# Patient Record
Sex: FEMALE | Race: WHITE
[De-identification: names, ages, dates, MRNs, and addresses within clinical notes are randomized per-mention and may not be internally consistent; named-entity substitution may affect disease eponyms.]

---

## 2017-01-03 ENCOUNTER — HOSPITAL ENCOUNTER (OUTPATIENT)
Dept: HOSPITAL 45 - C.NUCL | Age: 77
Discharge: HOME | End: 2017-01-03
Attending: INTERNAL MEDICINE
Payer: COMMERCIAL

## 2017-01-03 DIAGNOSIS — R76.8: ICD-10-CM

## 2017-01-03 DIAGNOSIS — M54.5: ICD-10-CM

## 2017-01-03 DIAGNOSIS — M54.12: Primary | ICD-10-CM

## 2017-01-04 LAB
ALBUMIN SERPL-MCNC: 3.7 G/DL (ref 3.8–4.8)
BETA-2-GLOBULIN: 0.3 G/DL (ref 0.2–0.5)
ELECTROPHORESIS INTERPRET: (no result)
GAMMA GLOB SERPL ELPH-MCNC: 0.8 G/DL (ref 0.8–1.7)
PROT SERPL-MCNC: 6 G/DL (ref 6.2–8.3)

## 2017-01-13 ENCOUNTER — HOSPITAL ENCOUNTER (OUTPATIENT)
Dept: HOSPITAL 45 - C.RAD1850 | Age: 77
Discharge: HOME | End: 2017-01-13
Attending: INTERNAL MEDICINE
Payer: COMMERCIAL

## 2017-01-13 DIAGNOSIS — M51.36: ICD-10-CM

## 2017-01-13 DIAGNOSIS — R93.7: Primary | ICD-10-CM

## 2017-01-13 DIAGNOSIS — M47.814: ICD-10-CM

## 2017-01-13 DIAGNOSIS — M16.9: ICD-10-CM

## 2017-01-13 NOTE — DIAGNOSTIC IMAGING REPORT
THORACIC SPINE 3 VIEWS



HISTORY:      R93.7 Abnormal bone scan of thoracic [id number]



COMPARISON: Bone scan 1/3/2017.



FINDINGS: There is no fracture.  No subluxation. Mild dextroscoliosis of the

lower thoracic spine. Moderate degenerative disease throughout the thoracic

spine. There are severe degenerative disc disease at L1-L2. Some of this may

account for the radiotracer uptake on the bone scan. No suspicious lytic or

blastic osseous lesions identified within the thoracic spine. Paraspinal soft

tissues are unremarkable.



IMPRESSION:  



1. Moderate degenerative disease throughout the thoracic spine and severe

degenerative disc disease at L1-L2. Some of this may account for the radiotracer

uptake on the bone scan.

2. No suspicious lytic or blastic osseous lesions within the thoracic spine by

conventional radiographic technique.







Electronically signed by:  Matias Vaca M.D.

1/13/2017 9:31 AM



Dictated Date/Time:  1/13/2017 9:27 AM

## 2017-01-13 NOTE — DIAGNOSTIC IMAGING REPORT
PELVIS 1 OR 2 VIEW ROUTINE



CLINICAL HISTORY: Abnormal bone scan.    



COMPARISON STUDY:  Bone scan dated 1/3/2017, AP pelvis dated 6/16/2015



FINDINGS: There are postsurgical changes of a total right hip arthroplasty. No

fractures are visualized. There are sclerotic changes involving the inferior

margin left SI joint which may explain the focus of increased activity on the

recent bone scan. No destructive lesions are identified. Degenerative changes

are present within the visualized portions of the lower lumbar spine



IMPRESSION:  

1. No acute fractures

2. Degenerative changes with sclerosis involving the inferior margin of the left

SI joint. This likely explains the corresponding focus of increased activity on

the most recent bone scan









Electronically signed by:  Stephane Thakkar M.D.

1/13/2017 9:36 AM



Dictated Date/Time:  1/13/2017 9:34 AM

## 2017-02-07 ENCOUNTER — HOSPITAL ENCOUNTER (OUTPATIENT)
Dept: HOSPITAL 45 - C.MRIBC | Age: 77
Discharge: HOME | End: 2017-02-07
Attending: ORTHOPAEDIC SURGERY
Payer: COMMERCIAL

## 2017-02-07 DIAGNOSIS — M48.06: Primary | ICD-10-CM

## 2017-02-07 NOTE — DIAGNOSTIC IMAGING REPORT
MRI LUMBAR SPINE W/O CONTRAST



CLINICAL HISTORY: Low back pain with bilateral leg radiculopathy. Spinal

stenosis.    



TECHNIQUE: Sagittal and axial T1, T2 and STIR images were obtained.



COMPARISON STUDY:  10/22/2014 



OBSERVATIONS:



The vertebral bodies and posterior elements appear intact. There is no abnormal

bony signal present to suggest a marrow replacement process.



L1-2: There is marked degenerative change with degenerative endplate marrow

edema. There is a circumferential disc bulge present. There is no significant

spinal foraminal stenosis



L2-3: There is a circumferential disc bulge present. There is no significant

spinal foraminal stenosis



L3-4: There is a circumferential disc bulge present with mild spinal stenosis.

There is no significant foraminal narrowing.



L4-5: There is a grade 1 spondylolisthesis of L4 and L5. There is advanced facet

joint arthropathy. There is moderate to severe spinal stenosis. There is no

significant foraminal narrowing.



L5-S1: There is a grade 1 spondylolisthesis of L5 and S1. There is facet joint

arthropathy. There is no significant spinal or foraminal stenosis.



The conus medullaris and cauda equina appear normal.



IMPRESSION: No significant change from the preceding study. Multilevel

spondylitic changes. Mild spinal stenosis the L3-4 level, and moderate to severe

spinal stenosis at the L4-5 level.







Electronically signed by:  Stephane Thakkar M.D.

2/7/2017 11:46 AM



Dictated Date/Time:  2/7/2017 11:40 AM

## 2017-03-13 ENCOUNTER — HOSPITAL ENCOUNTER (OUTPATIENT)
Dept: HOSPITAL 45 - C.MAMM | Age: 77
End: 2017-03-13
Attending: OBSTETRICS & GYNECOLOGY
Payer: COMMERCIAL

## 2017-03-13 DIAGNOSIS — Z12.31: Primary | ICD-10-CM

## 2017-03-13 NOTE — MAMMOGRAPHY REPORT
BILATERAL DIGITAL SCREENING MAMMOGRAM WITH CAD: 3/13/2017

CLINICAL HISTORY: Routine screening.  Patient has no complaints.  





TECHNIQUE: Bilateral CC and MLO views were obtained.  Current study was also evaluated with a Comput
er Aided Detection (CAD) system.  



COMPARISON: Comparison is made to exams dated:  3/10/2016 mammogram, 2/11/2014 mammogram, 4/5/2016 u
ltrasound, and 4/5/2016 mammogram - [facility].   



BREAST COMPOSITION:  The tissue of both breasts is heterogeneously dense, which may obscure small ma
sses.  



FINDINGS: The parenchymal pattern is similar to prior exams.  There are stable round microcalcificat
ions in the right breast.  No new suspicious mass, architectural distortion or cluster of microcalci
fications is seen.  



IMPRESSION:  ACR BI-RADS CATEGORY 1: NEGATIVE

There is no mammographic evidence of malignancy. A 1 year screening mammogram is recommended.  The p
atient will receive written notification of the results.  





Approximately 10% of breast cancers are not detected with mammography. A negative mammographic repor
t should not delay biopsy if a clinically suggestive mass is present.



Krissy Meeks M.D.          

ay/:3/13/2017 12:20:06  



Imaging Technologist: Carolyn COREA(JAZMINE)(M), [facility]

letter sent: Normal 1/2  

BI-RADS Code: ACR BI-RADS Category 1: Negative

## 2017-04-22 ENCOUNTER — HOSPITAL ENCOUNTER (OUTPATIENT)
Dept: HOSPITAL 45 - C.LABBC | Age: 77
Discharge: HOME | End: 2017-04-22
Attending: INTERNAL MEDICINE
Payer: COMMERCIAL

## 2017-04-22 DIAGNOSIS — E11.9: ICD-10-CM

## 2017-04-22 DIAGNOSIS — E78.5: Primary | ICD-10-CM

## 2017-04-22 DIAGNOSIS — J30.9: ICD-10-CM

## 2017-04-22 DIAGNOSIS — I47.1: ICD-10-CM

## 2017-04-22 DIAGNOSIS — M15.9: ICD-10-CM

## 2017-04-22 DIAGNOSIS — M51.36: ICD-10-CM

## 2017-04-22 DIAGNOSIS — G47.00: ICD-10-CM

## 2017-04-22 LAB
ALBUMIN/GLOB SERPL: 1.2 {RATIO} (ref 0.9–2)
ALP SERPL-CCNC: 48 U/L (ref 45–117)
ALT SERPL-CCNC: 32 U/L (ref 12–78)
ANION GAP SERPL CALC-SCNC: 8 MMOL/L (ref 3–11)
AST SERPL-CCNC: 23 U/L (ref 15–37)
BASOPHILS # BLD: 0.02 K/UL (ref 0–0.2)
BASOPHILS NFR BLD: 0.3 %
BUN SERPL-MCNC: 18 MG/DL (ref 7–18)
BUN/CREAT SERPL: 21.7 (ref 10–20)
CALCIUM SERPL-MCNC: 8.8 MG/DL (ref 8.5–10.1)
CHLORIDE SERPL-SCNC: 104 MMOL/L (ref 98–107)
CHOLEST/HDLC SERPL: 2.1 {RATIO}
CO2 SERPL-SCNC: 29 MMOL/L (ref 21–32)
COMPLETE: YES
CREAT SERPL-MCNC: 0.83 MG/DL (ref 0.6–1.2)
EOSINOPHIL NFR BLD AUTO: 266 K/UL (ref 130–400)
EST. AVERAGE GLUCOSE BLD GHB EST-MCNC: 123 MG/DL
GLOBULIN SER-MCNC: 3.3 GM/DL (ref 2.5–4)
GLUCOSE SERPL-MCNC: 99 MG/DL (ref 70–99)
GLUCOSE UR QL: 88 MG/DL
HCT VFR BLD CALC: 45.8 % (ref 37–47)
IG%: 0.1 %
IMM GRANULOCYTES NFR BLD AUTO: 28.8 %
KETONES UR QL STRIP: 80 MG/DL
LYMPHOCYTES # BLD: 2.07 K/UL (ref 1.2–3.4)
MCH RBC QN AUTO: 31.1 PG (ref 25–34)
MCHC RBC AUTO-ENTMCNC: 33.2 G/DL (ref 32–36)
MCV RBC AUTO: 93.7 FL (ref 80–100)
MONOCYTES NFR BLD: 8.2 %
NEUTROPHILS # BLD AUTO: 1 %
NEUTROPHILS NFR BLD AUTO: 61.6 %
NITRITE UR QL STRIP: 96 MG/DL (ref 0–150)
PH UR: 187 MG/DL (ref 0–200)
PMV BLD AUTO: 9.6 FL (ref 7.4–10.4)
POTASSIUM SERPL-SCNC: 4.2 MMOL/L (ref 3.5–5.1)
RBC # BLD AUTO: 4.89 M/UL (ref 4.2–5.4)
SODIUM SERPL-SCNC: 141 MMOL/L (ref 136–145)
TSH SERPL-ACNC: 1.55 UIU/ML (ref 0.3–4.5)
VERY LOW DENSITY LIPOPROT CALC: 19 MG/DL
WBC # BLD AUTO: 7.2 K/UL (ref 4.8–10.8)

## 2017-06-29 ENCOUNTER — HOSPITAL ENCOUNTER (EMERGENCY)
Dept: HOSPITAL 45 - C.EDB | Age: 77
LOS: 1 days | Discharge: HOME | End: 2017-06-30
Payer: COMMERCIAL

## 2017-06-29 VITALS
WEIGHT: 138.01 LBS | HEIGHT: 62.01 IN | BODY MASS INDEX: 25.08 KG/M2 | WEIGHT: 138.01 LBS | HEIGHT: 62.01 IN | BODY MASS INDEX: 25.08 KG/M2

## 2017-06-29 VITALS — TEMPERATURE: 97.52 F

## 2017-06-29 DIAGNOSIS — Z79.899: ICD-10-CM

## 2017-06-29 DIAGNOSIS — M54.2: ICD-10-CM

## 2017-06-29 DIAGNOSIS — M19.90: ICD-10-CM

## 2017-06-29 DIAGNOSIS — R19.7: ICD-10-CM

## 2017-06-29 DIAGNOSIS — R00.0: ICD-10-CM

## 2017-06-29 DIAGNOSIS — R42: ICD-10-CM

## 2017-06-29 DIAGNOSIS — Z79.82: ICD-10-CM

## 2017-06-29 DIAGNOSIS — I10: ICD-10-CM

## 2017-06-29 DIAGNOSIS — R00.2: Primary | ICD-10-CM

## 2017-06-29 LAB
ANION GAP SERPL CALC-SCNC: 4 MMOL/L (ref 3–11)
BASOPHILS # BLD: 0.04 K/UL (ref 0–0.2)
BASOPHILS NFR BLD: 0.7 %
BUN SERPL-MCNC: 18 MG/DL (ref 7–18)
BUN/CREAT SERPL: 19.4 (ref 10–20)
CALCIUM SERPL-MCNC: 9.3 MG/DL (ref 8.5–10.1)
CHLORIDE SERPL-SCNC: 105 MMOL/L (ref 98–107)
CO2 SERPL-SCNC: 30 MMOL/L (ref 21–32)
COMPLETE: YES
CREAT CL PREDICTED SERPL C-G-VRATE: 45.5 ML/MIN
CREAT SERPL-MCNC: 0.9 MG/DL (ref 0.6–1.2)
EOSINOPHIL NFR BLD AUTO: 263 K/UL (ref 130–400)
GLUCOSE SERPL-MCNC: 168 MG/DL (ref 70–99)
HCT VFR BLD CALC: 43.3 % (ref 37–47)
IG%: 0.2 %
IMM GRANULOCYTES NFR BLD AUTO: 34.8 %
LYMPHOCYTES # BLD: 2.08 K/UL (ref 1.2–3.4)
MCH RBC QN AUTO: 31.4 PG (ref 25–34)
MCHC RBC AUTO-ENTMCNC: 34.2 G/DL (ref 32–36)
MCV RBC AUTO: 91.9 FL (ref 80–100)
MONOCYTES NFR BLD: 8.7 %
NEUTROPHILS # BLD AUTO: 3.5 %
NEUTROPHILS NFR BLD AUTO: 52.1 %
PMV BLD AUTO: 9.3 FL (ref 7.4–10.4)
POTASSIUM SERPL-SCNC: 3.8 MMOL/L (ref 3.5–5.1)
RBC # BLD AUTO: 4.71 M/UL (ref 4.2–5.4)
SODIUM SERPL-SCNC: 139 MMOL/L (ref 136–145)
WBC # BLD AUTO: 5.97 K/UL (ref 4.8–10.8)

## 2017-06-29 NOTE — DIAGNOSTIC IMAGING REPORT
CHEST ONE VIEW PORTABLE



CLINICAL HISTORY: Chest pain. Tachycardia.    



COMPARISON STUDY:  Chest radiograph April 30, 2015.



FINDINGS: There is no pneumothorax or pleural effusion. Linear bilateral

opacities suggest atelectasis. There is no consolidation to suggest pneumonia.

Cardiomediastinal silhouette is normal. The appearance of the chest is

unchanged. 



IMPRESSION:  No acute cardiopulmonary findings. 









Electronically signed by:  Yoel Goodman M.D.

6/29/2017 9:37 PM



Dictated Date/Time:  6/29/2017 9:34 PM

## 2017-06-30 VITALS — HEART RATE: 85 BPM | SYSTOLIC BLOOD PRESSURE: 162 MMHG | DIASTOLIC BLOOD PRESSURE: 92 MMHG | OXYGEN SATURATION: 95 %

## 2017-06-30 NOTE — EMERGENCY ROOM VISIT NOTE
History


Report prepared by Brittany:  Susy Abbott


Under the Supervision of:  Dr. Abdi Delaney D.O.


First contact with patient:  20:32


Chief Complaint:  TACHYCARDIA


Stated Complaint:  RAPID HEARTRATE, STIFF NECK, THROAT ULCERS, DIZZY





History of Present Illness


The patient is a 76 year old female who presents to the Emergency Room with 

complaints of intermittent rapid heart rate/palpitations beginning 13 minutes 

ago. The patient states that last week she had a sore throat and mouth ulcer. 

She reports that she went to see her PCP and was told that it was viral. She 

notes that she was given Azithromycin and was told not to take it unless her 

symptoms persisted. She notes that she started it and has been on it for about 

3 days. She reports that today she was feeling lightheaded with a stiff neck. 

She notes that she had a rapid heart rate this morning 13 hours ago and it 

resolved after about 5 minutes before coming back tonight 2 hours ago. The 

patient notes that her rapid heart rate lasted for about 20 minutes before she 

came in tonight.  She notes her heart feels like it is beating regularly as 

opposed to a regular.  She did not check her pulse.  She complains of dizziness 

and 3 episodes of diarrhea. The patient denies any chest pain, shortness of 

breath, fever, leg swelling, coughing up blood, history of cancer, previous 

blood clots, recent trips, recent surgeries or smoking.. She reports that she 

has a history of high cholesterol and SVT 12 years ago and this feels slightly 

similar.  Patient also denies a history of diabetes, hypertension, and CAD.  

She does take a statin.





   Source of History:  patient


   Onset:  13 hours ago


   Position:  other (global)


   Symptom Intensity:  2 episodes


   Quality:  other (rapid heart rate)


   Timing:  intermittent


   Associated Symptoms:  + sorethroat, + neck pain, No fevers, No chest pain, 

No SOB


Note:


Pt complains of mouth sores. The patient denies any leg swelling, coughing up 

blood, swelling of the calves.





Review of Systems


See HPI for pertinent positives & negatives. A total of 10 systems reviewed and 

were otherwise negative.





Past Medical & Surgical


Medical Problems:


(1) Hypertension Nos


(2) Osteoarthros Nos-Unspec








Family History





No pertinent family history





Social History


Smoking Status:  Never Smoker


Smokeless Tobacco Use:  No


Marital Status:  


Occupation Status:  retired





Current/Historical Medications


Scheduled


Aspirin (Aspirin Ec), 81 MG PO QPM


Azithromycin (Azithromycin), PO UD


Cyclosporine (Ophth) (Restasis), 1 DROP OPB BID


Epinephrine (Epinephrine), 1 DOSE INJ PRN


Metoprolol Succinate (Toprol Xl), 50 MG PO QPM


Pravastatin Sodium (Pravastatin Sodium), 40 MG PO DAILY





Scheduled PRN


Albuterol Hfa (Ventolin Hfa), 2 PUFFS INH Q4 PRN for SOB/Wheezing


Diclofenac Sod (Diclofenac Sodium Dr), 50 MG PO BID PRN for Pain


Zolpidem Tartrate (Ambien), 5-10 MG PO HS PRN for Insomnia





Allergies


Coded Allergies:  


     Amoxicillin (Verified  Allergy, Unknown, C. DIFF, 3/16/17)


     Pine Nut (Verified  Allergy, Unknown, BREATHING ISSUES, 3/16/17)


     Solifenacin (Verified  Allergy, Unknown, CONSTIPATION, 3/16/17)


     POLLEN (Verified  Adverse Reaction, Unknown, SNEEZING;ITCHY EYES, 3/16/17)


     Ragweed (Verified  Adverse Reaction, Unknown, SNEEZING, 3/16/17)





Physical Exam


Vital Signs











  Date Time  Temp Pulse Resp B/P (MAP) Pulse Ox O2 Delivery O2 Flow Rate FiO2


 


6/29/17 23:49  63 18 172/89 96 Room Air  


 


6/29/17 22:28  69 16 164/90 93 Room Air  


 


6/29/17 21:07  77 16 171/88 96 Room Air  


 


6/29/17 21:04  71      


 


6/29/17 20:24 36.4 97 20 161/99 94 Room Air  











Physical Exam


GENERAL: sitting up in bed, alert, well appearing, well nourished, no distress, 

non-toxic 


EYE EXAM: normal conjunctiva


OROPHARYNX: no exudate, no erythema, lips, buccal mucosa, and tongue normal and 

mucous membranes are moist


NECK: supple, no nuchal rigidity, no adenopathy, non-tender, no JVD


LUNGS: Clear to auscultation. Normal chest wall mechanics


HEART: no murmurs, S1 normal and S2 normal 


ABDOMEN: abdomen soft, non-tender, normo-active bowel sounds, no masses, no 

rebound or guarding. 


BACK: Back is symmetrical on inspection and there is no deformity, no midline 

tenderness, no CVA tenderness. 


SKIN: no rashes and no bruising 


UPPER EXTREMITIES: upper extremities are grossly normal. 


LOWER EXTREMITIES: No pitting edema. Calves are equal bilaterally


NEURO EXAM: Normal sensorium, cranial nerves II-XII grossly intact, normal 

speech,  no gross weakness of arms, no gross weakness of legs.





Medical Decision & Procedures


ER Provider


Diagnostic Interpretation:


Radiology results as stated below per my review and the radiologist's 

interpretation: 





CHEST ONE VIEW PORTABLE





FINDINGS: There is no pneumothorax or pleural effusion. Linear bilateral


opacities suggest atelectasis. There is no consolidation to suggest pneumonia.


Cardiomediastinal silhouette is normal. The appearance of the chest is


unchanged. 





IMPRESSION:  No acute cardiopulmonary findings. 





Electronically signed by:  Yoel Goodman M.D.


6/29/2017 9:37 PM





Dictated Date/Time:  6/29/2017 9:34 PM





Laboratory Results


6/29/17 21:05








Red Blood Count 4.71, Mean Corpuscular Volume 91.9, Mean Corpuscular Hemoglobin 

31.4, Mean Corpuscular Hemoglobin Concent 34.2, Mean Platelet Volume 9.3, 

Neutrophils (%) (Auto) 52.1, Lymphocytes (%) (Auto) 34.8, Monocytes (%) (Auto) 

8.7, Eosinophils (%) (Auto) 3.5, Basophils (%) (Auto) 0.7, Neutrophils # (Auto) 

3.11, Lymphocytes # (Auto) 2.08, Monocytes # (Auto) 0.52, Eosinophils # (Auto) 

0.21, Basophils # (Auto) 0.04





6/29/17 21:05

















Test


  6/29/17


21:05 6/29/17


23:19


 


White Blood Count


  5.97 K/uL


(4.8-10.8) 


 


 


Red Blood Count


  4.71 M/uL


(4.2-5.4) 


 


 


Hemoglobin


  14.8 g/dL


(12.0-16.0) 


 


 


Hematocrit 43.3 % (37-47)  


 


Mean Corpuscular Volume


  91.9 fL


() 


 


 


Mean Corpuscular Hemoglobin


  31.4 pg


(25-34) 


 


 


Mean Corpuscular Hemoglobin


Concent 34.2 g/dl


(32-36) 


 


 


Platelet Count


  263 K/uL


(130-400) 


 


 


Mean Platelet Volume


  9.3 fL


(7.4-10.4) 


 


 


Neutrophils (%) (Auto) 52.1 %  


 


Lymphocytes (%) (Auto) 34.8 %  


 


Monocytes (%) (Auto) 8.7 %  


 


Eosinophils (%) (Auto) 3.5 %  


 


Basophils (%) (Auto) 0.7 %  


 


Neutrophils # (Auto)


  3.11 K/uL


(1.4-6.5) 


 


 


Lymphocytes # (Auto)


  2.08 K/uL


(1.2-3.4) 


 


 


Monocytes # (Auto)


  0.52 K/uL


(0.11-0.59) 


 


 


Eosinophils # (Auto)


  0.21 K/uL


(0-0.5) 


 


 


Basophils # (Auto)


  0.04 K/uL


(0-0.2) 


 


 


RDW Standard Deviation


  45.0 fL


(36.4-46.3) 


 


 


RDW Coefficient of Variation


  13.4 %


(11.5-14.5) 


 


 


Immature Granulocyte % (Auto) 0.2 %  


 


Immature Granulocyte # (Auto)


  0.01 K/uL


(0.00-0.02) 


 


 


Anion Gap


  4.0 mmol/L


(3-11) 


 


 


Est Creatinine Clear Calc


Drug Dose 45.5 ml/min 


  


 


 


Estimated GFR (


American) 71.5 


  


 


 


Estimated GFR (Non-


American 61.7 


  


 


 


BUN/Creatinine Ratio 19.4 (10-20)  


 


Calcium Level


  9.3 mg/dl


(8.5-10.1) 


 





Laboratory results per my review.





ECG


Indication:  tachycardia


Rate (beats per minute):  69


Rhythm:  sinus rhythm


Findings:  no ectopy, other (normal axis)





ED Course


ED COURSE: 


Vital signs were reviewed and showed hypertension


The patients medical record was reviewed


The above diagnostic studies were performed and reviewed.


ED treatments and interventions as stated above. 





2035: The patient was evaluated in room A2. A complete history and physical 

examination was performed.





2247: I reevaluated and updated the patient. The patient had palpitations when 

she was here that appeared to be PACs. 





2324: I reevaluated and updated the patient. 





0015: Upon reevaluation, the patient is doing well.I discussed my findings with 

the patient and she understands and agrees with the treatment plan.   


Based on the patients age, coexisting illnesses, exam and lab findings the 

decision to treat as an outpatient was made.


The patient remained stable while under my care.


The patient appeared well at the time of discharge.





Medical Decision


Differential diagnosis:


Etiologies such as premature contractions, electrolyte abnormality, cardiac 

dysrhythmia, thyroid dysfunction, pulmonary embolism, infection, 

gastrointestinal, as well as others were entertained.





Medication Reconciliation: I attest that I have personally reviewed the patient'

s current medication list.


Blood pressure screening: Patient was found to have an elevated blood pressure 

and was referred to their primary doctor for recheck and further treatment. 





Patient is a 76-year-old female who presents the ER for palpitations which 

occurred on 2 separate occasions today.  She has a history of SVT.  She notes 

that this does feel similar.  Both events were short-lived.  No risk factors 

for PEs.  Denies any chest pain or shortness of breath.  Chest x-ray 

unremarkable.  EKG was nonischemic.  Troponins were negative 2.  She did 

complain of palpitations while here in the ER and the monitor showed that she 

was having PACs.  I do believe this likely cause of her symptoms, consequently 

TSH was not obtained as she was symptomatic with monitor showing PACs.  I do 

not believe that this is ischemic in nature. She may benefit from a Holter 

monitor.  Patient believes that the symptoms are possibly related to 

azithromycin she was taking for a sore throat.  Intervals are normal.  I do not 

believe this is an allergic reaction.  She is completely neurologically intact.

  No signs of meningitis or encephalitis on exam.  Her neck stiffness is 

muscular in nature. Discussed with Pt concerning signs and symptoms to watch 

out for. Pt was instructed to follow up with their PCP and discussed with the 

patient their option to return to the ED at anytime for persistent or worsening 

symptoms. The appropriate anticipatory guidance and out-patient management, 

including indications for return to the emergency department, were explained at 

length to the patient and understood.





Impression





 Primary Impression:  


 Palpitations





Scribe Attestation


The scribe's documentation has been prepared under my direction and personally 

reviewed by me in its entirety. I confirm that the note above accurately 

reflects all work, treatment, procedures, and medical decision making performed 

by me.





Departure Information


Dispostion


Home / Self-Care





Referrals


Dov Torres M.D. (PCP)





Forms


HOME CARE DOCUMENTATION FORM,                                                 

               IMPORTANT VISIT INFORMATION, WORK / SCHOOL INSTRUCTIONS





Patient Instructions


ED Palpitations, My [facility]





Additional Instructions





Please follow up with your primary care doctor with in the next 24 hours.  Any 

worsening of your symptoms, please return to the ED immediately.  This includes 

chest pain, shortness breath, passing out, weakness or numbness in arms or legs

, swelling of her throat, difficult breathing, or any other concerning signs or 

symptoms from your standpoint.

## 2017-08-15 ENCOUNTER — HOSPITAL ENCOUNTER (OUTPATIENT)
Dept: HOSPITAL 45 - C.LAB1850 | Age: 77
Discharge: HOME | End: 2017-08-15
Attending: INTERNAL MEDICINE
Payer: COMMERCIAL

## 2017-08-15 DIAGNOSIS — M54.5: Primary | ICD-10-CM

## 2017-08-15 DIAGNOSIS — Z79.1: ICD-10-CM

## 2017-08-15 LAB
ALT SERPL-CCNC: 26 U/L (ref 12–78)
AST SERPL-CCNC: 17 U/L (ref 15–37)
BASOPHILS # BLD: 0.04 K/UL (ref 0–0.2)
BASOPHILS NFR BLD: 0.5 %
COMPLETE: YES
CREAT SERPL-MCNC: 0.84 MG/DL (ref 0.6–1.2)
EOSINOPHIL NFR BLD AUTO: 236 K/UL (ref 130–400)
HCT VFR BLD CALC: 42.8 % (ref 37–47)
IG%: 0.1 %
IMM GRANULOCYTES NFR BLD AUTO: 39.2 %
LYMPHOCYTES # BLD: 2.89 K/UL (ref 1.2–3.4)
MCH RBC QN AUTO: 31.5 PG (ref 25–34)
MCHC RBC AUTO-ENTMCNC: 34.1 G/DL (ref 32–36)
MCV RBC AUTO: 92.2 FL (ref 80–100)
MONOCYTES NFR BLD: 6.4 %
NEUTROPHILS # BLD AUTO: 2.3 %
NEUTROPHILS NFR BLD AUTO: 51.5 %
PMV BLD AUTO: 9.8 FL (ref 7.4–10.4)
RBC # BLD AUTO: 4.64 M/UL (ref 4.2–5.4)
WBC # BLD AUTO: 7.38 K/UL (ref 4.8–10.8)

## 2017-10-06 ENCOUNTER — HOSPITAL ENCOUNTER (OUTPATIENT)
Dept: HOSPITAL 45 - C.PAPS | Age: 77
Discharge: HOME | End: 2017-10-06
Attending: OBSTETRICS & GYNECOLOGY
Payer: COMMERCIAL

## 2017-10-06 DIAGNOSIS — Z12.4: Primary | ICD-10-CM

## 2017-10-06 DIAGNOSIS — Z78.0: ICD-10-CM

## 2017-10-13 ENCOUNTER — HOSPITAL ENCOUNTER (OUTPATIENT)
Dept: HOSPITAL 45 - C.LAB1850 | Age: 77
Discharge: HOME | End: 2017-10-13
Attending: INTERNAL MEDICINE
Payer: COMMERCIAL

## 2017-10-13 DIAGNOSIS — E78.5: ICD-10-CM

## 2017-10-13 DIAGNOSIS — J30.9: ICD-10-CM

## 2017-10-13 DIAGNOSIS — M48.00: ICD-10-CM

## 2017-10-13 DIAGNOSIS — R73.9: ICD-10-CM

## 2017-10-13 DIAGNOSIS — I47.1: ICD-10-CM

## 2017-10-13 DIAGNOSIS — M15.9: Primary | ICD-10-CM

## 2017-10-13 LAB
ALBUMIN/GLOB SERPL: 1.1 {RATIO} (ref 0.9–2)
ALP SERPL-CCNC: 59 U/L (ref 45–117)
ALT SERPL-CCNC: 29 U/L (ref 12–78)
ANION GAP SERPL CALC-SCNC: 8 MMOL/L (ref 3–11)
AST SERPL-CCNC: 13 U/L (ref 15–37)
BASOPHILS # BLD: 0.01 K/UL (ref 0–0.2)
BASOPHILS NFR BLD: 0.1 %
BUN SERPL-MCNC: 31 MG/DL (ref 7–18)
BUN/CREAT SERPL: 35.5 (ref 10–20)
CALCIUM SERPL-MCNC: 9.1 MG/DL (ref 8.5–10.1)
CHLORIDE SERPL-SCNC: 105 MMOL/L (ref 98–107)
CHOLEST/HDLC SERPL: 2.5 {RATIO}
CO2 SERPL-SCNC: 27 MMOL/L (ref 21–32)
COMPLETE: YES
CREAT SERPL-MCNC: 0.86 MG/DL (ref 0.6–1.2)
EOSINOPHIL NFR BLD AUTO: 294 K/UL (ref 130–400)
EST. AVERAGE GLUCOSE BLD GHB EST-MCNC: 126 MG/DL
GLOBULIN SER-MCNC: 3.5 GM/DL (ref 2.5–4)
GLUCOSE SERPL-MCNC: 93 MG/DL (ref 70–99)
GLUCOSE UR QL: 85 MG/DL
HCT VFR BLD CALC: 45.8 % (ref 37–47)
IG%: 0.2 %
IMM GRANULOCYTES NFR BLD AUTO: 24 %
KETONES UR QL STRIP: 108 MG/DL
LYMPHOCYTES # BLD: 2.25 K/UL (ref 1.2–3.4)
MCH RBC QN AUTO: 31.5 PG (ref 25–34)
MCHC RBC AUTO-ENTMCNC: 34.5 G/DL (ref 32–36)
MCV RBC AUTO: 91.2 FL (ref 80–100)
MONOCYTES NFR BLD: 10.3 %
NEUTROPHILS # BLD AUTO: 0.3 %
NEUTROPHILS NFR BLD AUTO: 65.1 %
NITRITE UR QL STRIP: 90 MG/DL (ref 0–150)
PH UR: 211 MG/DL (ref 0–200)
PMV BLD AUTO: 9.7 FL (ref 7.4–10.4)
POTASSIUM SERPL-SCNC: 4.1 MMOL/L (ref 3.5–5.1)
RBC # BLD AUTO: 5.02 M/UL (ref 4.2–5.4)
SODIUM SERPL-SCNC: 140 MMOL/L (ref 136–145)
VERY LOW DENSITY LIPOPROT CALC: 18 MG/DL
WBC # BLD AUTO: 9.39 K/UL (ref 4.8–10.8)

## 2017-11-20 ENCOUNTER — HOSPITAL ENCOUNTER (OUTPATIENT)
Dept: HOSPITAL 45 - C.RAD1850 | Age: 77
Discharge: HOME | End: 2017-11-20
Attending: PHYSICIAN ASSISTANT
Payer: COMMERCIAL

## 2017-11-20 DIAGNOSIS — J45.909: Primary | ICD-10-CM

## 2017-11-20 NOTE — DIAGNOSTIC IMAGING REPORT
CHEST 2 VIEWS ROUTINE



CLINICAL HISTORY: J45.909 [id number] Cough    



COMPARISON STUDY:  6/29/2017



FINDINGS: The heart is borderline enlarged. There is aortic tortuosity. There is

no failure. There is no focal pulmonary consolidation. There are no pleural

effusions. There is minor interstitial thickening/atelectatic change at the lung

bases.[ 



IMPRESSION: No active disease in the chest.







Electronically signed by:  Stephane Thakkar M.D.

11/20/2017 1:00 PM



Dictated Date/Time:  11/20/2017 12:59 PM

## 2018-02-05 ENCOUNTER — HOSPITAL ENCOUNTER (OUTPATIENT)
Dept: HOSPITAL 45 - C.LAB1850 | Age: 78
Discharge: HOME | End: 2018-02-05
Attending: PHYSICIAN ASSISTANT
Payer: COMMERCIAL

## 2018-02-05 DIAGNOSIS — R39.9: Primary | ICD-10-CM

## 2018-02-05 DIAGNOSIS — J02.9: ICD-10-CM

## 2018-03-03 ENCOUNTER — HOSPITAL ENCOUNTER (EMERGENCY)
Dept: HOSPITAL 45 - C.EDB | Age: 78
Discharge: HOME | End: 2018-03-03
Payer: COMMERCIAL

## 2018-03-03 VITALS — TEMPERATURE: 97.7 F

## 2018-03-03 VITALS
HEIGHT: 62.01 IN | BODY MASS INDEX: 24.44 KG/M2 | HEIGHT: 62.01 IN | WEIGHT: 134.48 LBS | WEIGHT: 134.48 LBS | BODY MASS INDEX: 24.44 KG/M2

## 2018-03-03 VITALS — HEART RATE: 62 BPM | SYSTOLIC BLOOD PRESSURE: 162 MMHG | DIASTOLIC BLOOD PRESSURE: 82 MMHG | OXYGEN SATURATION: 95 %

## 2018-03-03 DIAGNOSIS — Z91.018: ICD-10-CM

## 2018-03-03 DIAGNOSIS — S00.83XA: ICD-10-CM

## 2018-03-03 DIAGNOSIS — I10: ICD-10-CM

## 2018-03-03 DIAGNOSIS — Z79.82: ICD-10-CM
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
## 2018-03-03 DIAGNOSIS — Z91.048: ICD-10-CM
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
## 2018-03-03 DIAGNOSIS — Z88.8: ICD-10-CM

## 2018-03-03 DIAGNOSIS — T14.8XXA: Primary | ICD-10-CM

## 2018-03-03 DIAGNOSIS — Z88.1: ICD-10-CM

## 2018-03-03 LAB
BASOPHILS # BLD: 0.04 K/UL (ref 0–0.2)
BASOPHILS NFR BLD: 0.4 %
BUN SERPL-MCNC: 23 MG/DL (ref 7–18)
CALCIUM SERPL-MCNC: 9 MG/DL (ref 8.5–10.1)
CO2 SERPL-SCNC: 26 MMOL/L (ref 21–32)
CREAT SERPL-MCNC: 1 MG/DL (ref 0.6–1.2)
EOS ABS #: 0.13 K/UL (ref 0–0.5)
EOSINOPHIL NFR BLD AUTO: 227 K/UL (ref 130–400)
GLUCOSE SERPL-MCNC: 110 MG/DL (ref 70–99)
HCT VFR BLD CALC: 43.7 % (ref 37–47)
HGB BLD-MCNC: 15.2 G/DL (ref 12–16)
IG#: 0.02 K/UL (ref 0–0.02)
IMM GRANULOCYTES NFR BLD AUTO: 25.7 %
INR PPP: 0.9 (ref 0.9–1.1)
LYMPHOCYTES # BLD: 2.42 K/UL (ref 1.2–3.4)
MCH RBC QN AUTO: 31.5 PG (ref 25–34)
MCHC RBC AUTO-ENTMCNC: 34.8 G/DL (ref 32–36)
MCV RBC AUTO: 90.7 FL (ref 80–100)
MONO ABS #: 0.79 K/UL (ref 0.11–0.59)
MONOCYTES NFR BLD: 8.4 %
NEUT ABS #: 6.02 K/UL (ref 1.4–6.5)
NEUTROPHILS # BLD AUTO: 1.4 %
NEUTROPHILS NFR BLD AUTO: 63.9 %
PMV BLD AUTO: 10 FL (ref 7.4–10.4)
POTASSIUM SERPL-SCNC: 3.6 MMOL/L (ref 3.5–5.1)
PTT PATIENT: 23.6 SECONDS (ref 21–31)
RED CELL DISTRIBUTION WIDTH CV: 13.6 % (ref 11.5–14.5)
RED CELL DISTRIBUTION WIDTH SD: 45 FL (ref 36.4–46.3)
SODIUM SERPL-SCNC: 139 MMOL/L (ref 136–145)
WBC # BLD AUTO: 9.42 K/UL (ref 4.8–10.8)

## 2018-03-03 NOTE — DIAGNOSTIC IMAGING REPORT
CERVICAL SPINE W/O



CLINICAL HISTORY: 77 years-old Female presenting with s/p fall. 



TECHNIQUE: Multidetector CT of the cervical spine was performed without the use

of intravenous contrast. IV contrast: None. A dose lowering technique was used

consistent with the principles of ALARA (as low as reasonably achievable).



COMPARISON: Plain radiographs from 4/30/2015.



CT DOSE (mGy.cm): The estimated cumulative dose is 834.28 inclusive of

additional CT scans.



FINDINGS:



 topogram: Unremarkable.



Reversal of normal cervical lordosis with kyphotic deformity centered at C4-5.

No acute fracture or subluxation. Multilevel degenerative changes. Mild

vertebral body height loss of C5, likely degenerative in etiology.

Intervertebral disc height loss at C5-6 and C6-7, where there is the greatest

degree of degenerative change and disc osteophyte complexes. Smaller disc

osteophyte complex noted at C4-5. Facet arthropathy results in osseous neural

foraminal narrowing on the left at C3-4. Disc osteophyte complex/uncovertebral

hypertrophy in combination with facet arthropathy results in osseous neural

foraminal narrowing on the left at C4-5. Osseous neural foraminal narrowing

secondary to disc osteophyte complex on the right at C5-6 and C6-7. No

significant osseous spinal canal narrowing though posterior bony spurring is

present at the levels affected by disc osteophyte complexes.



IMPRESSION:

1.  No acute osseous injury of the cervical spine.



2.  Multilevel degenerative changes including kyphotic deformity. Osseous neural

foraminal narrowing at several levels as detailed above.







Electronically signed by:  Parveen Boland M.D.

3/3/2018 10:35 PM



Dictated Date/Time:  3/3/2018 10:31 PM

## 2018-03-03 NOTE — DIAGNOSTIC IMAGING REPORT
FACIAL BONES-MXILLOFAC WITHOUT



CLINICAL HISTORY: 77 years-old Female presenting with s/p fall. 



TECHNIQUE: Multidetector CT of the face was performed without the use of

intravenous contrast. IV contrast: None. A dose lowering technique was used

consistent with the principles of ALARA (as low as reasonably achievable).



COMPARISON: None.



CT DOSE (mGy.cm): The estimated cumulative dose is 834.28 inclusive of

additional CT scans.



FINDINGS:



 topogram: Unremarkable.



Trace mucosal thickening in the maxillary sinuses. Otherwise paranasal sinuses

and mastoid air cells clear. Middle ears clear. Bony nasal septum and nasal

bones intact. Skull base intact. No facial fracture. Temporal mandibular joints

and mandible intact. Teeth intact. Degenerative changes of the cervical spine.



IMPRESSION:

No acute osseous injury of the face.







Electronically signed by:  Parveen Boland M.D.

3/3/2018 10:38 PM



Dictated Date/Time:  3/3/2018 10:35 PM

## 2018-03-03 NOTE — EMERGENCY ROOM VISIT NOTE
History


Report prepared by Brittany:  Errol Silva


Under the Supervision of:  Dr. Romario Montelongo M.D.


First contact with patient:  21:01


Chief Complaint:  FALL


Stated Complaint:  FELL, HIT HEAD, BRUISING ON FOREHEAD/LIP, NAUSEA





History of Present Illness


The patient is a 77 year old white female with a past medical history of SVT 

and HTN who presents to the ED with a cc of a constant headache beginning three 

days ago. The patient states that she had only eaten oatmeal three days ago 

until her dinner. She reports that she then had a five course meal that 

included five different glasses of wine. The patient states that she sat for 

three hours and did not feel different. She reports that she tried to get up 

and was not able to. The patient states that she was driven back home and her 

friend helped escort her to the bathroom. She is accompanied by her friend who 

states that the patient was sitting on the toilet. Her friend states that when 

she checked on the patient again, she found the patient with her back to the 

[facility]. The patient states that she fell but does not remember the episode 

well. She denies any LOC. The patient states that she developed a bruise to the 

left side of her face and anterior head pain the following day. The patient 

states that she developed nausea at breakfast this morning. She reports that 

throughout that day she developed a headache posteriorly. She reports that she 

used ice and Tylenol for her symptoms with some mild relief. Positive head pain

, bruising, nausea. Negative LOC, jaw pain, face pain, chest pain, abdominal 

pain, pelvis pain, back pain, UE pain, and LE pain.





   Source of History:  patient


   Onset:  three days ago


   Position:  head


   Quality:  ache


   Timing:  constant


   Modifying Factors (Relieving):  tylenol


   Associated Symptoms:  + nausea, No LOC, No chest pain, No abdominal pain, No 

back pain





Review of Systems


See HPI for pertinent positives and negatives.  A total of ten systems were 

reviewed and were otherwise negative.





Past Medical & Surgical


Medical Problems:


(1) Hypertension Nos


(2) Osteoarthros Nos-Unspec








Family History





No pertinent family history





Social History


Smoking Status:  Never Smoker


Marital Status:  


Occupation Status:  retired





Current/Historical Medications


Scheduled


Aspirin (Aspirin Ec), 81 MG PO QPM


Cyclosporine (Ophth) (Restasis), 1 DROP OPB BID


Epinephrine (Epinephrine), 1 DOSE INJ PRN


Metoprolol Succinate (Toprol Xl), 50 MG PO QPM


Pravastatin Sodium (Pravastatin Sodium), 40 MG PO DAILY





Scheduled PRN


Albuterol Hfa (Ventolin Hfa), 2 PUFFS INH Q4 PRN for SOB/Wheezing


Diclofenac Sod (Diclofenac Sodium Dr), 50 MG PO BID PRN for Pain


Zolpidem Tartrate (Ambien), 5-10 MG PO HS PRN for Insomnia





Allergies


Coded Allergies:  


     Amoxicillin (Verified  Allergy, Unknown, C. DIFF, 10/20/17)


     Pine Nut (Verified  Allergy, Unknown, BREATHING ISSUES, 10/20/17)


     Solifenacin (Verified  Allergy, Unknown, CONSTIPATION, 10/20/17)


     POLLEN (Verified  Adverse Reaction, Unknown, SNEEZING;ITCHY EYES, 10/20/17)


     Ragweed (Verified  Adverse Reaction, Unknown, SNEEZING, 10/20/17)





Physical Exam


Vital Signs











  Date Time  Temp Pulse Resp B/P (MAP) Pulse Ox O2 Delivery O2 Flow Rate FiO2


 


3/3/18 23:00  62 20 162/82 95 Room Air  


 


3/3/18 20:53 36.5 66 16 186/100 95 Room Air  











Physical Exam


GENERAL: Awake, alert, well-appearing, NAD, 


HENT: Mild pain to left frontal temple area, ecchymosis present.


EYES: Normal conjunctiva. Sclera non-icteric.


NECK: Supple. No nuchal rigidity. FROM.


RESPIRATORY: CTAB, no rhonchi, wheezing, crackles


CARDIAC: RRR, no MRG


ABDOMEN: Soft, NTND, BS+


MSK: No chest wall TTP, no LE edema, no tenderness to arms, legs, chest, abdomen

, and pelvis. No midline C-spine TTP.


NEURO: CN 2-12 intact, 5/5 upper and lower extremity strength, no dysmetria, no 

drift, good finger to nose, no sensory deficits. 


SKIN: No rash or jaundice noted.





Medical Decision & Procedures


ER Provider


Diagnostic Interpretation:


Radiology results as stated below per my review and radiologist interpretation: 





FACIAL BONES-MXILLOFAC WITHOUT





CLINICAL HISTORY: 77 years-old Female presenting with s/p fall. 





TECHNIQUE: Multidetector CT of the face was performed without the use of


intravenous contrast. IV contrast: None. A dose lowering technique was used


consistent with the principles of ALARA (as low as reasonably achievable).





COMPARISON: None.





CT DOSE (mGy.cm): The estimated cumulative dose is 834.28 inclusive of


additional CT scans.





FINDINGS:





 topogram: Unremarkable.





Trace mucosal thickening in the maxillary sinuses. Otherwise paranasal sinuses


and mastoid air cells clear. Middle ears clear. Bony nasal septum and nasal


bones intact. Skull base intact. No facial fracture. Temporal mandibular joints


and mandible intact. Teeth intact. Degenerative changes of the cervical spine.





IMPRESSION:


No acute osseous injury of the face.











Electronically signed by:  Parveen Boland M.D.


3/3/2018 10:38 PM





Dictated Date/Time:  3/3/2018 10:35 PM








HEAD WITHOUT CONTRAST (CT)





CLINICAL HISTORY: 77 years-old Female presenting with Evaluate for hemorrhage or


pathology, fall, headache. 





TECHNIQUE: Multidetector CT imaging of the head was performed without the use of


intravenous contrast. IV contrast: None. A dose lowering technique was used


consistent with the principles of ALARA (as low as reasonably achievable). 





COMPARISON: 8/16/2009





CT DOSE (mGy.cm): The estimated cumulative dose is 834.28 mGy.cm.





FINDINGS: 





 topogram: Unremarkable.





Proportional ventricular and sulcal prominence, likely age-related parenchymal


volume loss. Brain parenchyma normal in appearance with preserved gray-white


differentiation. No mass effect or midline shift. No hemorrhage or acute


territorial infarct. No extra-axial fluid collection. Paranasal sinuses and


mastoid air cells clear. Calvarium intact.    





IMPRESSION:


1.  No acute intracranial abnormality. 











Electronically signed by:  Parveen Boland M.D.


3/3/2018 10:31 PM





Dictated Date/Time:  3/3/2018 10:28 PM








CERVICAL SPINE W/O





CLINICAL HISTORY: 77 years-old Female presenting with s/p fall. 





TECHNIQUE: Multidetector CT of the cervical spine was performed without the use


of intravenous contrast. IV contrast: None. A dose lowering technique was used


consistent with the principles of ALARA (as low as reasonably achievable).





COMPARISON: Plain radiographs from 4/30/2015.





CT DOSE (mGy.cm): The estimated cumulative dose is 834.28 inclusive of


additional CT scans.





FINDINGS:





 topogram: Unremarkable.





Reversal of normal cervical lordosis with kyphotic deformity centered at C4-5.


No acute fracture or subluxation. Multilevel degenerative changes. Mild


vertebral body height loss of C5, likely degenerative in etiology.


Intervertebral disc height loss at C5-6 and C6-7, where there is the greatest


degree of degenerative change and disc osteophyte complexes. Smaller disc


osteophyte complex noted at C4-5. Facet arthropathy results in osseous neural


foraminal narrowing on the left at C3-4. Disc osteophyte complex/uncovertebral


hypertrophy in combination with facet arthropathy results in osseous neural


foraminal narrowing on the left at C4-5. Osseous neural foraminal narrowing


secondary to disc osteophyte complex on the right at C5-6 and C6-7. No


significant osseous spinal canal narrowing though posterior bony spurring is


present at the levels affected by disc osteophyte complexes.





IMPRESSION:


1.  No acute osseous injury of the cervical spine.





2.  Multilevel degenerative changes including kyphotic deformity. Osseous neural


foraminal narrowing at several levels as detailed above.











Electronically signed by:  Parveen Boland M.D.


3/3/2018 10:35 PM





Dictated Date/Time:  3/3/2018 10:31 PM





Laboratory Results


3/3/18 22:00








Red Blood Count 4.82, Mean Corpuscular Volume 90.7, Mean Corpuscular Hemoglobin 

31.5, Mean Corpuscular Hemoglobin Concent 34.8, Mean Platelet Volume 10.0, 

Neutrophils (%) (Auto) 63.9, Lymphocytes (%) (Auto) 25.7, Monocytes (%) (Auto) 

8.4, Eosinophils (%) (Auto) 1.4, Basophils (%) (Auto) 0.4, Neutrophils # (Auto) 

6.02, Lymphocytes # (Auto) 2.42, Monocytes # (Auto) 0.79, Eosinophils # (Auto) 

0.13, Basophils # (Auto) 0.04





3/3/18 22:00

















Test


  3/3/18


22:00


 


White Blood Count


  9.42 K/uL


(4.8-10.8)


 


Red Blood Count


  4.82 M/uL


(4.2-5.4)


 


Hemoglobin


  15.2 g/dL


(12.0-16.0)


 


Hematocrit 43.7 % (37-47) 


 


Mean Corpuscular Volume


  90.7 fL


()


 


Mean Corpuscular Hemoglobin


  31.5 pg


(25-34)


 


Mean Corpuscular Hemoglobin


Concent 34.8 g/dl


(32-36)


 


Platelet Count


  227 K/uL


(130-400)


 


Mean Platelet Volume


  10.0 fL


(7.4-10.4)


 


Neutrophils (%) (Auto) 63.9 % 


 


Lymphocytes (%) (Auto) 25.7 % 


 


Monocytes (%) (Auto) 8.4 % 


 


Eosinophils (%) (Auto) 1.4 % 


 


Basophils (%) (Auto) 0.4 % 


 


Neutrophils # (Auto)


  6.02 K/uL


(1.4-6.5)


 


Lymphocytes # (Auto)


  2.42 K/uL


(1.2-3.4)


 


Monocytes # (Auto)


  0.79 K/uL


(0.11-0.59)


 


Eosinophils # (Auto)


  0.13 K/uL


(0-0.5)


 


Basophils # (Auto)


  0.04 K/uL


(0-0.2)


 


RDW Standard Deviation


  45.0 fL


(36.4-46.3)


 


RDW Coefficient of Variation


  13.6 %


(11.5-14.5)


 


Immature Granulocyte % (Auto) 0.2 % 


 


Immature Granulocyte # (Auto)


  0.02 K/uL


(0.00-0.02)


 


Prothrombin Time


  9.8 SECONDS


(9.0-12.0)


 


Prothromb Time International


Ratio 0.9 (0.9-1.1) 


 


 


Activated Partial


Thromboplast Time 23.6 SECONDS


(21.0-31.0)


 


Partial Thromboplastin Ratio 0.9 


 


Anion Gap


  7.0 mmol/L


(3-11)


 


Est Creatinine Clear Calc


Drug Dose 39.9 ml/min 


 


 


Estimated GFR (


American) 62.5 


 


 


Estimated GFR (Non-


American 53.9 


 


 


BUN/Creatinine Ratio 23.0 (10-20) 


 


Calcium Level


  9.0 mg/dl


(8.5-10.1)





Laboratory results reviewed by me





ECG Per My Interpretation


Indication:  weakness


Rate (beats per minute):  76


Rhythm:  normal sinus


Findings:  PVC (occasional), other (Normal intervals, Normal axis, ectopy noted)





ED Course


2104: The patient was evaluated in room B03B. A complete history and physical 

exam was performed.





2243: I reevaluated the patient. Discussed results and discharge instructions: 

She verbalized understanding and agreement. The patient is ready for discharge.





Medical Decision


Triage Nursing notes reviewed. Prior records reviewed.





The patient is a 77 year old white female with a past medical history of SVT 

and HTN who presents to the ED with a cc of constant headache beginning three 

days ago.





The patient's presentation and history were concerning for etiologies such as 

migraine headache, meningitis, sinusitis, CO exposure, ICH, SAH, infection, 

tumor, headache, sinus thrombosis, arterial dissection, as well as others were 

entertained.





Patient was seen and evaluated the bedside.  Patient reportedly had a fall on 

Wednesday.  The patient had 5 glasses of wine and had been seated for fair 

amount of time during dinner.  Patient did not have any LOC.  Patient does take 

a baby aspirin but no other blood thinning medications.  Patient has had some 

mild intermittent headache.  Patient denies any numbness tingling or weakness.  

On exam the patient has a nonfocal neurologic exam.  Patient does have 

ecchymosis over the left frontotemporal area.  Patient does not have any vision 

changes and no midline C-spine tenderness.  Patient denies any pain to 

palpation in any of her extremities, chest, back, or abdomen.  Patient did have 

basic blood work completed along with coagulation studies, EKG and, CT of the 

head C-spine and face.  Patient's blood work was fairly unremarkable.  Patient'

s EKG did show sinus with frequent PVCs.  The patient denies any chest pains.  

Patient CTs were negative acute.  Patient was told that she should follow-up 

with her primary care as she may have some concussion-like symptoms although 

she did not have any LOC.  Less likely a bleed in the brain as the patient is 

several days out as a nonfocal neurologic exam only takes baby aspirin and has 

a negative CT.  Patient was deemed suitable for outpatient follow-up treatment 

at this time.  Patient was given strict follow-up, discharge, and return 

precautions.  All questions were answered.  Patient was deemed suitable for 

outpatient follow-up at this time.  Patient agreed with the plan of care and 

was safely discharged home.





Medication Reconcilliation


Current Medication List:  was personally reviewed by me





Blood Pressure Screening


Patient's blood pressure:  Elevated blood pressure


Blood pressure disposition:  Referred to PCP





Impression





 Primary Impression:  


 Fall


 Additional Impression:  


 Contusion of multiple sites





Scribe Attestation


The scribe's documentation has been prepared under my direction and personally 

reviewed by me in its entirety. I confirm that the note above accurately 

reflects all work, treatment, procedures, and medical decision making performed 

by me.





Departure Information


Dispostion


Home / Self-Care





Referrals


Dov Torres M.D. (PCP)





Patient Instructions


ED Mechanical Fall, ED Prevention Fall, My [facility]





Additional Instructions





Please return to the emergency department if you have worsening or recurrent 

symptoms not amenable to at-home treatment.  Please call for a follow-up 

appointment with her primary care physician.  Please take your medications as 

prescribed.  If you have other concerns and/or complaints please feel free to 

also call your primary care physician's office or return the ED for further 

evaluation, management, and treatment.





You may take 400 mg Ibuprofen every 6 hours as needed for pain with food for no 

more than 2 consecutive days. You may take tylenol 650 mg every 6 hours as 

needed for pain. You may take motrin and tylenol separately or at the same 

time. 





Take your medications as prescribed. 





You have been examined and treated today on an emergency basis only. This is 

not a substitute for, or an effort to provide, complete comprehensive medical 

care. It is impossible to recognize and treat all injuries or illnesses in a 

single emergency department visit. It is therefore important that you follow up 

closely with [facility], your PCP, and/or your specialist(s). 

Call as soon as possible for an appointment.





Thank you for your time and consideration. I look forward to speaking with you 

again soon. Please don't hesitate to call us if you have any questions.





Problem Qualifiers








 Primary Impression:  


 Fall


 Encounter type:  initial encounter  Qualified Codes:  W19.XXXA - Unspecified 

fall, initial encounter

## 2018-03-03 NOTE — DIAGNOSTIC IMAGING REPORT
HEAD WITHOUT CONTRAST (CT)



CLINICAL HISTORY: 77 years-old Female presenting with Evaluate for hemorrhage or

pathology, fall, headache. 



TECHNIQUE: Multidetector CT imaging of the head was performed without the use of

intravenous contrast. IV contrast: None. A dose lowering technique was used

consistent with the principles of ALARA (as low as reasonably achievable). 



COMPARISON: 8/16/2009



CT DOSE (mGy.cm): The estimated cumulative dose is 834.28 mGy.cm.



FINDINGS: 



 topogram: Unremarkable.



Proportional ventricular and sulcal prominence, likely age-related parenchymal

volume loss. Brain parenchyma normal in appearance with preserved gray-white

differentiation. No mass effect or midline shift. No hemorrhage or acute

territorial infarct. No extra-axial fluid collection. Paranasal sinuses and

mastoid air cells clear. Calvarium intact.    



IMPRESSION:

1.  No acute intracranial abnormality. 







Electronically signed by:  Parveen Boland M.D.

3/3/2018 10:31 PM



Dictated Date/Time:  3/3/2018 10:28 PM

## 2018-03-16 ENCOUNTER — HOSPITAL ENCOUNTER (OUTPATIENT)
Dept: HOSPITAL 45 - C.MAMM | Age: 78
Discharge: HOME | End: 2018-03-16
Attending: OBSTETRICS & GYNECOLOGY
Payer: COMMERCIAL

## 2018-03-16 DIAGNOSIS — Z12.31: Primary | ICD-10-CM

## 2018-07-24 ENCOUNTER — HOSPITAL ENCOUNTER (OUTPATIENT)
Dept: HOSPITAL 45 - C.MAMM | Age: 78
Discharge: HOME | End: 2018-07-24
Attending: OBSTETRICS & GYNECOLOGY
Payer: COMMERCIAL

## 2018-07-24 DIAGNOSIS — N64.9: Primary | ICD-10-CM

## 2018-07-24 NOTE — MAMMOGRAPHY REPORT
UNILATERAL RIGHT DIGITAL DIAGNOSTIC MAMMOGRAM TOMOSYNTHESIS WITH CAD AND RIGHT ULTRASOUND: 7/24/2018

CLINICAL HISTORY: 77-year-old woman presents with a daily painful area of thickening in the superior 
right breast and occasional small pea-sized lump associated with the pain. The patient had a hard mejia
e discovering the small lump while position for the ultrasound today but was able to point out the ar
ea of pain. Patient denies any skin changes or nipple discharge. No family history of breast cancer. 
 





TECHNIQUE: Right breast CC and MLO 2D and tomosynthesis images were obtained.  Current study was also
 evaluated with a Computer Aided Detection (CAD) system.  



COMPARISON: Comparison is made to exams dated:  3/16/2018 mammogram, 3/13/2017 mammogram, 4/5/2016 ma
mmogram, 3/10/2016 mammogram, 3/10/2015 mammogram, and 2/11/2014 mammogram - [facility]
nter.   



BREAST COMPOSITION: The tissue of right breast is heterogeneously dense, which may obscure small mass
es.  



FINDINGS: A triangular palpable marker overlies the upper outer middle one third of the right breast,
 denoting the area of pain and lump pointed out by the patient.  The glandular tissue pattern is stab
le comparing to prior mammograms.  No obvious new masses, asymmetries, areas of architectural distort
ion or suspicious calcifications are seen.  There are a few scattered benign round calcifications in 
the right breast.  No skin thickening or nipple retraction appreciated.



Targeted ultrasound was performed in the area of concern pointed out by the patient, approximately 9:
00 axis, 3-4 cm from the nipple.  I also scanned in the 10:00 and 11:00 axes in the area of pain desc
ribed by the patient.  Sonographically normal tissue is identified throughout the right upper outer q
uadrant on ultrasound, without evidence of a suspicious solid or cystic mass.  No focal skin thickeni
ng or drainable fluid collection.





IMPRESSION: ACR BI-RADS CATEGORY 1: NEGATIVE, ULTRASOUND ACR BI-RADS CATEGORY 1: NEGATIVE 

Stable mammographic appearance of the right breast.  There is no mammographic or targeted sonographic
 evidence of malignancy or other suspicious abnormality to explain the focal pain and intermittent pe
a-sized lump described by the patient.  Therefore, continued clinical follow-up and clinical monitori
ng is recommended, as biopsy of a clinically suspicious mass should not be precluded by negative imag
ing.  Otherwise return to annual screening mammography schedule, due in March 2019.



These results and recommendations were discussed with the patient at the time of the exam. 



Some breast cancers are not detected with mammography. A negative mammographic report should not val
y biopsy if a clinically suggestive mass is present.



Krissy Meeks M.D.          

ay/:7/24/2018 13:42:54  



Imaging Technologist: RT Alexey(R)(M), [facility]

letter sent: Normal 1/2  

OVERALL STUDY BIRADS: 1 Negative

## 2021-02-27 NOTE — MAMMOGRAPHY REPORT
BILATERAL DIGITAL SCREENING MAMMOGRAM TOMOSYNTHESIS WITH CAD: 3/16/2018

CLINICAL HISTORY: Routine screening.  The patient reports occasional tenderness in the right upper ou
ter quadrant.  





TECHNIQUE:  Breast tomosynthesis in addition to standard 2D mammography was performed. Current study 
was also evaluated with a Computer Aided Detection (CAD) system.  



COMPARISON: Comparison is made to exams dated:  3/13/2017 mammogram, 4/5/2016 mammogram, 3/10/2016 ma
mmogram, 3/10/2015 mammogram, 1/22/2013 mammogram, and 2/11/2014 mammogram - [facility]
nter.   



BREAST COMPOSITION:  The tissue of both breasts is heterogeneously dense, which may obscure small mas
ses.  



FINDINGS:  No suspicious masses, calcifications, or areas of architectural distortion are noted in ei
ther breast. There has been no significant interval change compared to prior exams.  

 



IMPRESSION:  ACR BI-RADS CATEGORY 1: NEGATIVE

There is no mammographic evidence of malignancy. A 1 year screening mammogram is recommended.  Also r
ecommend clinical follow-up for occasional tenderness in the right upper outer quadrant.  The patient
 will receive written notification of the results.  





Approximately 10% of breast cancers are not detected with mammography. A negative mammographic report
 should not delay biopsy if a clinically suggestive mass is present.



Sunni Galvez M.D.          

/:3/16/2018 11:23:16  



Imaging Technologist: Lilly Montelongo RT(R)(M), [facility]

letter sent: Normal 1/2  

BI-RADS Code: ACR BI-RADS Category 1: Negative age(85 years old or older)/bones(Osteoporosis,prev fx,steroid use,metastatic bone ca)